# Patient Record
Sex: MALE | Race: WHITE
[De-identification: names, ages, dates, MRNs, and addresses within clinical notes are randomized per-mention and may not be internally consistent; named-entity substitution may affect disease eponyms.]

---

## 2017-02-06 ENCOUNTER — HOSPITAL ENCOUNTER (OUTPATIENT)
Dept: HOSPITAL 53 - M SFHCPLAZ | Age: 37
End: 2017-02-06
Attending: NURSE PRACTITIONER
Payer: COMMERCIAL

## 2017-02-06 DIAGNOSIS — E55.9: ICD-10-CM

## 2017-02-06 DIAGNOSIS — Z53.9: ICD-10-CM

## 2017-02-06 DIAGNOSIS — E03.9: Primary | ICD-10-CM

## 2017-02-08 ENCOUNTER — HOSPITAL ENCOUNTER (OUTPATIENT)
Dept: HOSPITAL 53 - M WUC | Age: 37
End: 2017-02-08
Attending: NURSE PRACTITIONER
Payer: SELF-PAY

## 2017-02-08 DIAGNOSIS — E03.9: Primary | ICD-10-CM

## 2017-02-08 DIAGNOSIS — E55.9: ICD-10-CM

## 2017-02-08 LAB — T4 FREE SERPL-MCNC: 0.9 NG/DL (ref 0.76–1.46)

## 2017-03-02 ENCOUNTER — HOSPITAL ENCOUNTER (OUTPATIENT)
Dept: HOSPITAL 53 - M LABDRAW1 | Age: 37
End: 2017-03-02
Attending: NURSE PRACTITIONER
Payer: SELF-PAY

## 2017-03-02 DIAGNOSIS — R42: Primary | ICD-10-CM

## 2017-03-02 LAB
ALBUMIN SERPL BCG-MCNC: 3.7 GM/DL (ref 3.2–5.2)
ALBUMIN/GLOB SERPL: 1.19 {RATIO} (ref 1–1.93)
ALP SERPL-CCNC: 63 U/L (ref 45–117)
ALT SERPL W P-5'-P-CCNC: 23 U/L (ref 12–78)
ANION GAP SERPL CALC-SCNC: 9 MEQ/L (ref 8–16)
AST SERPL-CCNC: 13 U/L (ref 15–37)
BASOPHILS # BLD AUTO: 0 K/MM3 (ref 0–0.2)
BASOPHILS NFR BLD AUTO: 0.3 % (ref 0–1)
BILIRUB SERPL-MCNC: 0.5 MG/DL (ref 0.2–1)
BUN SERPL-MCNC: 13 MG/DL (ref 7–18)
CALCIUM SERPL-MCNC: 8.6 MG/DL (ref 8.5–10.1)
CHLORIDE SERPL-SCNC: 108 MEQ/L (ref 98–107)
CO2 SERPL-SCNC: 24 MEQ/L (ref 21–32)
CREAT SERPL-MCNC: 0.69 MG/DL (ref 0.7–1.3)
EOSINOPHIL # BLD AUTO: 0.4 K/MM3 (ref 0–0.5)
EOSINOPHIL NFR BLD AUTO: 5.9 % (ref 0–3)
ERYTHROCYTE [DISTWIDTH] IN BLOOD BY AUTOMATED COUNT: 12.8 % (ref 11.5–14.5)
GFR SERPL CREATININE-BSD FRML MDRD: > 60 ML/MIN/{1.73_M2} (ref 60–?)
GLUCOSE SERPL-MCNC: 100 MG/DL (ref 70–105)
LARGE UNSTAINED CELL #: 0.2 K/MM3 (ref 0–0.4)
LARGE UNSTAINED CELL %: 2 % (ref 0–4)
LYMPHOCYTES # BLD AUTO: 2.3 K/MM3 (ref 1.5–4.5)
LYMPHOCYTES NFR BLD AUTO: 28.8 % (ref 24–44)
MCH RBC QN AUTO: 31.8 PG (ref 27–33)
MCHC RBC AUTO-ENTMCNC: 33.7 G/DL (ref 32–36.5)
MCV RBC AUTO: 94.3 FL (ref 80–96)
MONOCYTES # BLD AUTO: 0.6 K/MM3 (ref 0–0.8)
MONOCYTES NFR BLD AUTO: 8.2 % (ref 0–5)
NEUTROPHILS # BLD AUTO: 4 K/MM3 (ref 1.8–7.7)
NEUTROPHILS NFR BLD AUTO: 54.8 % (ref 36–66)
PLATELET # BLD AUTO: 230 K/MM3 (ref 150–450)
POTASSIUM SERPL-SCNC: 4.2 MEQ/L (ref 3.5–5.1)
PROT SERPL-MCNC: 6.8 GM/DL (ref 6.4–8.2)
SODIUM SERPL-SCNC: 141 MEQ/L (ref 136–145)
WBC # BLD AUTO: 7.3 K/MM3 (ref 4–10)

## 2017-08-23 ENCOUNTER — HOSPITAL ENCOUNTER (OUTPATIENT)
Dept: HOSPITAL 53 - M WUC | Age: 37
End: 2017-08-23
Attending: NURSE PRACTITIONER
Payer: COMMERCIAL

## 2017-08-23 DIAGNOSIS — R42: Primary | ICD-10-CM

## 2017-08-23 LAB
ALBUMIN SERPL BCG-MCNC: 4 GM/DL (ref 3.2–5.2)
ALBUMIN/GLOB SERPL: 1.21 {RATIO} (ref 1–1.93)
ALP SERPL-CCNC: 69 U/L (ref 45–117)
ALT SERPL W P-5'-P-CCNC: 27 U/L (ref 12–78)
ANION GAP SERPL CALC-SCNC: 9 MEQ/L (ref 8–16)
AST SERPL-CCNC: 15 U/L (ref 15–37)
BASOPHILS # BLD AUTO: 0.1 K/MM3 (ref 0–0.2)
BASOPHILS NFR BLD AUTO: 0.7 % (ref 0–1)
BILIRUB SERPL-MCNC: 0.4 MG/DL (ref 0.2–1)
BUN SERPL-MCNC: 13 MG/DL (ref 7–18)
CALCIUM SERPL-MCNC: 8.8 MG/DL (ref 8.5–10.1)
CHLORIDE SERPL-SCNC: 107 MEQ/L (ref 98–107)
CO2 SERPL-SCNC: 25 MEQ/L (ref 21–32)
CREAT SERPL-MCNC: 0.79 MG/DL (ref 0.7–1.3)
EOSINOPHIL # BLD AUTO: 0.6 K/MM3 (ref 0–0.5)
EOSINOPHIL NFR BLD AUTO: 5.5 % (ref 0–3)
ERYTHROCYTE [DISTWIDTH] IN BLOOD BY AUTOMATED COUNT: 13.2 % (ref 11.5–14.5)
GFR SERPL CREATININE-BSD FRML MDRD: > 60 ML/MIN/{1.73_M2} (ref 60–?)
GLUCOSE SERPL-MCNC: 94 MG/DL (ref 70–105)
LARGE UNSTAINED CELL #: 0.2 K/MM3 (ref 0–0.4)
LARGE UNSTAINED CELL %: 1.7 % (ref 0–4)
LYMPHOCYTES # BLD AUTO: 3.3 K/MM3 (ref 1.5–4.5)
LYMPHOCYTES NFR BLD AUTO: 29.4 % (ref 24–44)
MCH RBC QN AUTO: 32 PG (ref 27–33)
MCHC RBC AUTO-ENTMCNC: 33.4 G/DL (ref 32–36.5)
MCV RBC AUTO: 95.6 FL (ref 80–96)
MONOCYTES # BLD AUTO: 0.6 K/MM3 (ref 0–0.8)
MONOCYTES NFR BLD AUTO: 5.6 % (ref 0–5)
NEUTROPHILS # BLD AUTO: 6.1 K/MM3 (ref 1.8–7.7)
NEUTROPHILS NFR BLD AUTO: 57.1 % (ref 36–66)
PLATELET # BLD AUTO: 247 K/MM3 (ref 150–450)
POTASSIUM SERPL-SCNC: 4 MEQ/L (ref 3.5–5.1)
PROT SERPL-MCNC: 7.3 GM/DL (ref 6.4–8.2)
SODIUM SERPL-SCNC: 141 MEQ/L (ref 136–145)
WBC # BLD AUTO: 10.6 K/MM3 (ref 4–10)

## 2018-02-26 ENCOUNTER — HOSPITAL ENCOUNTER (OUTPATIENT)
Dept: HOSPITAL 53 - M WUC | Age: 38
End: 2018-02-26
Attending: NURSE PRACTITIONER
Payer: COMMERCIAL

## 2018-02-26 DIAGNOSIS — E03.9: Primary | ICD-10-CM

## 2018-02-26 LAB
FREE T4: 0.77 NG/DL (ref 0.76–1.46)
THYROID STIMULATING HORMONE: 4.09 UIU/ML (ref 0.36–3.74)

## 2018-02-26 PROCEDURE — 84443 ASSAY THYROID STIM HORMONE: CPT

## 2020-07-26 ENCOUNTER — HOSPITAL ENCOUNTER (EMERGENCY)
Dept: HOSPITAL 53 - M ED | Age: 40
Discharge: HOME | End: 2020-07-26
Payer: COMMERCIAL
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
## 2020-07-26 DIAGNOSIS — F32.9: ICD-10-CM
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
## 2020-07-26 DIAGNOSIS — S93.402A: Primary | ICD-10-CM

## 2020-07-26 DIAGNOSIS — F12.90: ICD-10-CM

## 2020-07-26 DIAGNOSIS — Z88.0: ICD-10-CM
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
## 2020-07-26 DIAGNOSIS — F17.200: ICD-10-CM

## 2020-09-30 ENCOUNTER — HOSPITAL ENCOUNTER (EMERGENCY)
Dept: HOSPITAL 53 - M ED | Age: 40
Discharge: HOME | End: 2020-09-30
Payer: COMMERCIAL

## 2020-09-30 VITALS — HEIGHT: 68 IN | WEIGHT: 170.2 LBS | BODY MASS INDEX: 25.79 KG/M2

## 2020-09-30 VITALS — DIASTOLIC BLOOD PRESSURE: 76 MMHG | SYSTOLIC BLOOD PRESSURE: 133 MMHG

## 2020-09-30 DIAGNOSIS — F41.9: ICD-10-CM
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
## 2020-09-30 DIAGNOSIS — S61.212A: ICD-10-CM
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
## 2020-09-30 DIAGNOSIS — S61.011A: Primary | ICD-10-CM

## 2020-09-30 DIAGNOSIS — S66.193A: ICD-10-CM

## 2020-09-30 DIAGNOSIS — Z88.0: ICD-10-CM

## 2020-09-30 DIAGNOSIS — F32.9: ICD-10-CM

## 2020-09-30 DIAGNOSIS — Z79.899: ICD-10-CM
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
## 2020-09-30 DIAGNOSIS — F17.200: ICD-10-CM

## 2020-10-06 NOTE — ER
DATE OF CONSULTATION:  09/30/2020



CHIEF COMPLAINT: Right middle finger laceration. 



HISTORY OF PRESENT ILLNESS:  This 40-year-old man seen today in clinic.  He 
sustained a laceration right middle finger on the ulnar side.  He was working at
[employer] at his usual job cleaning the bathrooms.  This occurred earlier 
this morning.  Metal from the toilet paper dispenser came down, cut his hand.  
He was given Tetanus, Clindamycin and had irrigation of his hand performed by 
the PA at [facility].  He is left hand dominant. No prior history 
of problems with the fingers. 



PAST MEDICAL HISTORY:  Depression.



MEDICATIONS: 

-   Citalopram 



ALLERGIES: PENICILLIN. 



PAST SURGICAL HISTORY:  Knee surgeries.

 

SOCIAL HISTORY:  He smokes half a pack of cigarettes a day.  He works at 
[employer].  This is a work-related injury. 



PHYSICAL EXAMINATION:  This is a well-appearing 40-year-old man. There is a 
longitudinal laceration on the ulnar side of his right middle digit.  He is able
to flex and extend at the MCP, PIP and DIP joints. Normal sensation both sides 
of the finger. Capillary refill under three seconds.  Full range of motion. No 
evidence of instability at the phalanxes. No obvious extensor or flexor tendon 
injury. He also has a small sutured laceration inferolateral thumb, quite small,
a mm or less.



Radiographs are reviewed of the hand.  No obvious fracture.  



ASSESSMENT AND PLAN:  A 40-year-old man who has a laceration on the ulnar side 
of his right third finger.  Performed irrigation and debridement after 
discussion of pros, cons, risks and benefits of that and suturing.  He has no 
obvious fracture. I asked him to receive Tetanus which he already had as well as
place on outpatient antibiotics and follow up in two days time in the office.  
I would like to start immediate range of motion but no heavy lifting or soaking.



PROCEDURE NOTE:  Consent verbal obtained. I performed irrigation and debridement
and suturing of the  small 1-1/2 inch long incision centered over the ulnar side
of his right third digit.  He consented to this. I performed a thorough 
irrigation and debridement with Iodine and normal saline.  Sutured subcutaneous 
tissues with 2-0 Vicryl sutures skin with 3-0 Ethilon in a horizontal mattress 
fashion. I placed gauze on top of this as well as Ace bandage. Encouraged range 
of motion, elevation. Comminuted findings to the PA and asked him to be 
discharged home when he is comfortable. 

LEONARD

## 2020-10-20 NOTE — ER
DATE OF CONSULTATION: 09/30/2020



REASON FOR CONSULTATION: Right middle finger laceration. 



HISTORY OF PRESENT ILLNESS:  This 40-year-old man seen today in clinic. He 
sustained a laceration to the right middle finger on the ulnar side. He was 
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 This 
happened at 6:45 this morning. The metal from the toilet paper dispenser came 
down and cut his hand. He was given a tetanus and clindamycin and had an 
irrigation of his hand performed by the PA at [facility]. He is 
left hand dominant. No prior history of problems with the fingers. 



PAST MEDICAL HISTORY: Depression.



MEDICATIONS: Citalopram. 



ALLERGIES: PENICILLIN.



PAST SURGICAL HISTORY: Knee surgeries.



SOCIAL HISTORY: Smokes a half-pack of cigarettes per day. He works at [employer]. This is a work-related injury. 



PHYSICAL EXAMINATION: This is a well-appearing 40-year-old man. There is a 
longitudinal laceration on the ulnar side of his right middle digit. He is able 
to flex and extend the metacarpophalangeal (MCP), proximal interphalangeal 
(PIP), and distal interphalangeal (DIP) joints. Normal sensation to both sides 
of the finger. Capillary refill under 3 seconds. Full range of motion. No 
evidence of instability at the phalanxes. No obvious flexor tendon injury. 



He also has a small sutured laceration at the lateral corner by the thumb 1 mm 
or less. 



IMAGING: Radiographs were reviewed of the hand. There is no obvious fracture. 



ASSESSMENT AND PLAN: 

This 40-year-old man has a laceration on the ulnar side of this right third 
finger. We performed irrigation and debridement after discussion of pros, cons, 
risks and benefits of that, and suturing. He has no obvious fracture. Planned to
receive tetanus, which he already had, as well as placed on outpatient 
antibiotics, and follow-up in two days time in the office. I would like him to 
start immediate range of motion, but no heavy lifting or soaking this. 



PROCEDURE NOTE: 

We talked about the pros, cons, risks, and benefits of going ahead with 
irrigation, debridement, and suturing of the small 1.5 inch long incision center
of the ulnar side of his right third digit. He consented to this. We performed 
thorough irrigation and debridement with iodine and normal saline. We sutured 
with 2-0 Vicryl sutures and skin with 3-0 Ethilon in a horizontal mattress 
fashion. Placed gauze on top of this, as well as Ace bandage, and very gentle 
range of motion and elevation. Communicated the findings to the PA and asked him
to be discharged home when he is comfortable.  

LEONARD

## 2023-02-07 ENCOUNTER — HOSPITAL ENCOUNTER (EMERGENCY)
Dept: HOSPITAL 53 - M ED | Age: 43
Discharge: HOME | End: 2023-02-07
Payer: COMMERCIAL

## 2023-02-07 VITALS
DIASTOLIC BLOOD PRESSURE: 81 MMHG | WEIGHT: 159.84 LBS | HEIGHT: 68 IN | BODY MASS INDEX: 24.22 KG/M2 | SYSTOLIC BLOOD PRESSURE: 158 MMHG

## 2023-02-07 DIAGNOSIS — Z88.0: ICD-10-CM

## 2023-02-07 DIAGNOSIS — F32.A: ICD-10-CM

## 2023-02-07 DIAGNOSIS — F17.200: ICD-10-CM

## 2023-02-07 DIAGNOSIS — S30.0XXA: Primary | ICD-10-CM
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
## 2023-02-07 DIAGNOSIS — F41.9: ICD-10-CM

## 2023-02-09 ENCOUNTER — HOSPITAL ENCOUNTER (EMERGENCY)
Dept: HOSPITAL 53 - M ED | Age: 43
Discharge: HOME | End: 2023-02-09
Payer: COMMERCIAL

## 2023-02-09 VITALS — BODY MASS INDEX: 21.58 KG/M2 | WEIGHT: 142.42 LBS | HEIGHT: 68 IN

## 2023-02-09 VITALS — SYSTOLIC BLOOD PRESSURE: 164 MMHG | DIASTOLIC BLOOD PRESSURE: 92 MMHG

## 2023-02-09 DIAGNOSIS — R91.8: ICD-10-CM

## 2023-02-09 DIAGNOSIS — Z88.0: ICD-10-CM

## 2023-02-09 DIAGNOSIS — S20.211A: Primary | ICD-10-CM

## 2023-02-09 DIAGNOSIS — F41.9: ICD-10-CM

## 2023-02-09 DIAGNOSIS — M25.78: ICD-10-CM
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
## 2023-02-09 DIAGNOSIS — F32.A: ICD-10-CM

## 2023-02-09 DIAGNOSIS — S29.012A: ICD-10-CM

## 2023-02-09 PROCEDURE — 72125 CT NECK SPINE W/O DYE: CPT

## 2023-02-09 PROCEDURE — 70450 CT HEAD/BRAIN W/O DYE: CPT

## 2023-02-09 PROCEDURE — 71250 CT THORAX DX C-: CPT

## 2023-02-09 PROCEDURE — 72128 CT CHEST SPINE W/O DYE: CPT

## 2023-02-09 PROCEDURE — 96372 THER/PROPH/DIAG INJ SC/IM: CPT

## 2023-02-09 PROCEDURE — 99283 EMERGENCY DEPT VISIT LOW MDM: CPT

## 2023-02-09 PROCEDURE — 72131 CT LUMBAR SPINE W/O DYE: CPT

## 2023-03-25 ENCOUNTER — HOSPITAL ENCOUNTER (EMERGENCY)
Dept: HOSPITAL 53 - M ED | Age: 43
Discharge: HOME | End: 2023-03-25
Payer: COMMERCIAL

## 2023-03-25 VITALS — DIASTOLIC BLOOD PRESSURE: 97 MMHG | SYSTOLIC BLOOD PRESSURE: 168 MMHG

## 2023-03-25 VITALS — HEIGHT: 68 IN | BODY MASS INDEX: 24.56 KG/M2 | WEIGHT: 162.04 LBS
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
## 2023-03-25 DIAGNOSIS — S01.81XA: Primary | ICD-10-CM

## 2023-04-18 ENCOUNTER — HOSPITAL ENCOUNTER (EMERGENCY)
Dept: HOSPITAL 53 - M ED | Age: 43
Discharge: HOME | End: 2023-04-18
Payer: COMMERCIAL

## 2023-04-18 VITALS
WEIGHT: 164.69 LBS | SYSTOLIC BLOOD PRESSURE: 155 MMHG | DIASTOLIC BLOOD PRESSURE: 79 MMHG | BODY MASS INDEX: 24.96 KG/M2 | HEIGHT: 68 IN
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
## 2023-04-18 DIAGNOSIS — S62.646A: Primary | ICD-10-CM

## 2023-04-18 DIAGNOSIS — F32.9: ICD-10-CM

## 2023-04-18 DIAGNOSIS — Z88.0: ICD-10-CM
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
## 2023-04-18 DIAGNOSIS — F17.210: ICD-10-CM

## 2023-04-18 DIAGNOSIS — F41.9: ICD-10-CM
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
## 2023-05-01 ENCOUNTER — HOSPITAL ENCOUNTER (OUTPATIENT)
Dept: HOSPITAL 53 - M SOG | Age: 43
End: 2023-05-01
Attending: PHYSICIAN ASSISTANT
Payer: COMMERCIAL
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
## 2023-05-01 DIAGNOSIS — S62.616A: Primary | ICD-10-CM

## 2023-05-18 ENCOUNTER — HOSPITAL ENCOUNTER (OUTPATIENT)
Dept: HOSPITAL 53 - M SOG | Age: 43
End: 2023-05-18
Attending: ORTHOPAEDIC SURGERY
Payer: COMMERCIAL

## 2023-05-18 DIAGNOSIS — M79.644: ICD-10-CM

## 2023-05-18 DIAGNOSIS — S61.212D: Primary | ICD-10-CM

## 2023-06-19 ENCOUNTER — HOSPITAL ENCOUNTER (OUTPATIENT)
Dept: HOSPITAL 53 - M SOG | Age: 43
End: 2023-06-19
Attending: PHYSICIAN ASSISTANT
Payer: COMMERCIAL
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
## 2023-06-19 DIAGNOSIS — S62.616A: Primary | ICD-10-CM

## 2023-07-11 ENCOUNTER — HOSPITAL ENCOUNTER (EMERGENCY)
Dept: HOSPITAL 53 - M ED | Age: 43
LOS: 1 days | Discharge: LEFT BEFORE DISCHARGE | End: 2023-07-12
Payer: COMMERCIAL

## 2023-07-11 VITALS
TEMPERATURE: 98 F | HEIGHT: 68 IN | SYSTOLIC BLOOD PRESSURE: 138 MMHG | WEIGHT: 167.35 LBS | OXYGEN SATURATION: 97 % | BODY MASS INDEX: 25.36 KG/M2 | DIASTOLIC BLOOD PRESSURE: 83 MMHG

## 2023-07-11 DIAGNOSIS — Z53.21: Primary | ICD-10-CM

## 2023-07-26 ENCOUNTER — HOSPITAL ENCOUNTER (OUTPATIENT)
Dept: HOSPITAL 53 - M LAB | Age: 43
End: 2023-07-26

## 2023-07-26 DIAGNOSIS — Z00.00: Primary | ICD-10-CM
